# Patient Record
Sex: MALE | Race: BLACK OR AFRICAN AMERICAN | NOT HISPANIC OR LATINO | Employment: FULL TIME | URBAN - METROPOLITAN AREA
[De-identification: names, ages, dates, MRNs, and addresses within clinical notes are randomized per-mention and may not be internally consistent; named-entity substitution may affect disease eponyms.]

---

## 2023-09-01 ENCOUNTER — OFFICE VISIT (OUTPATIENT)
Dept: SURGERY | Facility: CLINIC | Age: 48
End: 2023-09-01
Payer: OTHER GOVERNMENT

## 2023-09-01 ENCOUNTER — LAB VISIT (OUTPATIENT)
Dept: LAB | Facility: HOSPITAL | Age: 48
End: 2023-09-01
Attending: SURGERY
Payer: OTHER GOVERNMENT

## 2023-09-01 VITALS
BODY MASS INDEX: 25.29 KG/M2 | WEIGHT: 186.75 LBS | DIASTOLIC BLOOD PRESSURE: 97 MMHG | RESPIRATION RATE: 16 BRPM | HEIGHT: 72 IN | TEMPERATURE: 98 F | SYSTOLIC BLOOD PRESSURE: 148 MMHG | HEART RATE: 73 BPM

## 2023-09-01 DIAGNOSIS — N62 GYNECOMASTIA, MALE: Primary | ICD-10-CM

## 2023-09-01 DIAGNOSIS — N62 GYNECOMASTIA, MALE: ICD-10-CM

## 2023-09-01 LAB
HCG INTACT+B SERPL-ACNC: <0.6 MIU/ML
T4 FREE SERPL-MCNC: 0.68 NG/DL (ref 0.71–1.51)
TSH SERPL DL<=0.005 MIU/L-ACNC: 1.28 UIU/ML (ref 0.34–5.6)

## 2023-09-01 PROCEDURE — 36415 COLL VENOUS BLD VENIPUNCTURE: CPT | Performed by: SURGERY

## 2023-09-01 PROCEDURE — 84443 ASSAY THYROID STIM HORMONE: CPT | Performed by: SURGERY

## 2023-09-01 PROCEDURE — 82670 ASSAY OF TOTAL ESTRADIOL: CPT | Performed by: SURGERY

## 2023-09-01 PROCEDURE — 84439 ASSAY OF FREE THYROXINE: CPT | Performed by: SURGERY

## 2023-09-01 PROCEDURE — 99205 PR OFFICE/OUTPT VISIT, NEW, LEVL V, 60-74 MIN: ICD-10-PCS | Mod: S$GLB,,, | Performed by: SURGERY

## 2023-09-01 PROCEDURE — 84403 ASSAY OF TOTAL TESTOSTERONE: CPT | Performed by: SURGERY

## 2023-09-01 PROCEDURE — 83002 ASSAY OF GONADOTROPIN (LH): CPT | Performed by: SURGERY

## 2023-09-01 PROCEDURE — 99205 OFFICE O/P NEW HI 60 MIN: CPT | Mod: S$GLB,,, | Performed by: SURGERY

## 2023-09-01 PROCEDURE — 84702 CHORIONIC GONADOTROPIN TEST: CPT | Performed by: SURGERY

## 2023-09-01 RX ORDER — OLOPATADINE HYDROCHLORIDE 1 MG/ML
SOLUTION/ DROPS OPHTHALMIC
COMMUNITY
Start: 2023-07-28 | End: 2024-07-28

## 2023-09-01 RX ORDER — TADALAFIL 20 MG/1
TABLET ORAL
COMMUNITY
Start: 2023-01-04 | End: 2024-01-10

## 2023-09-01 RX ORDER — FLUTICASONE PROPIONATE 50 MCG
SPRAY, SUSPENSION (ML) NASAL
COMMUNITY
Start: 2023-01-04 | End: 2024-01-10

## 2023-09-01 NOTE — H&P
GENERAL SURGERY  OUTPATIENT H&P    REASON FOR VISIT/CC: Bilateral gynecomastia    HPI: Linwood Sims is a 48 y.o. male here for evaluation of gynecomastia both breast, left greater than right.    Recently underwent resection of left-sided gynecomastia approximally 15 years ago. Has developed recurrence on the left side and now also on the right side. Present for many months. Denies steroid use, thyroid issues, marijuana use and is not on any obvious medications that cause gynecomastia. Has not had lab work performed. Has undergone imaging which shows no masses or concerns for malignancy.    I have reviewed the patient's chart including prior progress notes, procedures and testing.     ROS:   Review of Systems    PROBLEM LIST:  There is no problem list on file for this patient.        HISTORY  No past medical history on file.    No past surgical history on file.    Social History     Tobacco Use    Smoking status: Never    Smokeless tobacco: Never       No family history on file.      MEDS:  Current Outpatient Medications on File Prior to Visit   Medication Sig Dispense Refill    fluticasone propionate (FLONASE) 50 mcg/actuation nasal spray INSTILL 1 SPRAY IN EACH NOSTRIL TWICE A DAY FOR ALLERGIES FOR ALLERGIES      olopatadine (PATADAY TWICE DAILY RELIEF) 0.1 % ophthalmic solution INSTILL 1 DROP IN EACH EYE TWICE A DAY FOR OCULAR ALLERGIES      tadalafiL (CIALIS) 20 MG Tab TAKE ONE TABLET BY MOUTH EVERY WEEK AS NEEDED FOR SEXUAL DYSFUNCTION (TAKE AT LEAST 30 MINUTES PRIOR TO ANTICIPATED SEXUAL ACTIVITY)       No current facility-administered medications on file prior to visit.       ALLERGIES:  Review of patient's allergies indicates:  No Known Allergies      VITALS:  Vitals:    09/01/23 1058   BP: (!) 148/97   Pulse: 73   Resp: 16   Temp: 98.4 °F (36.9 °C)         PHYSICAL EXAM:  Physical Exam  Vitals reviewed.   Constitutional:       General: He is not in acute distress.     Appearance: Normal appearance.  "He is well-developed.   HENT:      Head: Normocephalic and atraumatic.   Eyes:      General: No scleral icterus.  Neck:      Trachea: No tracheal deviation.   Cardiovascular:      Rate and Rhythm: Normal rate and regular rhythm.      Pulses: Normal pulses.   Pulmonary:      Effort: Pulmonary effort is normal. No respiratory distress.      Breath sounds: Normal breath sounds.   Chest:          Comments: Patient with fibrotic changes to bilateral breast, left greater than right, no nipple retraction or overlying skin changes, well-healed left-sided mane areolar incision  Abdominal:      General: There is no distension.      Palpations: Abdomen is soft.      Tenderness: There is no abdominal tenderness.   Musculoskeletal:         General: No swelling or tenderness. Normal range of motion.      Cervical back: Normal range of motion and neck supple. No rigidity.   Skin:     General: Skin is warm and dry.      Coloration: Skin is not jaundiced.      Findings: No erythema.   Neurological:      General: No focal deficit present.      Mental Status: He is alert and oriented to person, place, and time. He is not disoriented.      Motor: No weakness or abnormal muscle tone.   Psychiatric:         Mood and Affect: Mood normal.         Behavior: Behavior normal.         Thought Content: Thought content normal.         Judgment: Judgment normal.           LABS:  No results found for: "WBC", "RBC", "HGB", "HCT", "PLT"  No results found for: "GLU", "NA", "K", "CL", "CO2", "BUN", "CREATININE", "CALCIUM"  No results found for: "ALT", "AST", "GGT", "ALKPHOS", "BILITOT"  No results found for: "MG", "PHOS"    STUDIES:  Images and reports were personally reviewed.        ASSESSMENT & PLAN:  48 y.o. male with recurrent left gynecomastia, new right gynecomastia  -no obvious inciting factors, specifically no obvious side effect from medication or drug use  -imaging report reviewed  -has not lab workup, will send TSH, free T4, testosterone, " hCG, LH and estradiol labs sent, if any lab abnormalities are noted will need correction prior to surgical intervention  -may need surgery regardless due to fibrotic nature and unlikely spontaneous resolution, will research potential prophylactic therapy to prevent recurrence such as tamoxifen   -will contact us the results of lab workup

## 2023-09-02 LAB
ESTRADIOL SERPL-MCNC: 20.9 PG/ML (ref 7.6–42.6)
LH SERPL-ACNC: 3.6 MIU/ML (ref 1.7–8.6)
TESTOST SERPL-MCNC: 555 NG/DL (ref 264–916)

## 2023-09-18 ENCOUNTER — PATIENT MESSAGE (OUTPATIENT)
Dept: SURGERY | Facility: CLINIC | Age: 48
End: 2023-09-18
Payer: OTHER GOVERNMENT